# Patient Record
Sex: FEMALE | Race: WHITE | NOT HISPANIC OR LATINO | Employment: UNEMPLOYED | URBAN - METROPOLITAN AREA
[De-identification: names, ages, dates, MRNs, and addresses within clinical notes are randomized per-mention and may not be internally consistent; named-entity substitution may affect disease eponyms.]

---

## 2022-07-26 ENCOUNTER — TELEPHONE (OUTPATIENT)
Dept: PLASTIC SURGERY | Facility: CLINIC | Age: 37
End: 2022-07-26

## 2022-07-26 NOTE — TELEPHONE ENCOUNTER
What is your current bra size: [id number]  Has your bra cup size been stable for at least the last 6 months: [x]Yes []No    Have you had treatment for upper back, neck, and/or shoulder pain: []Yes [x]No     What treatment: (Physical therapy, chiropractor, massage therapy, medications,   Etc.MEDICATIONS   Do you have a rash and/or moisture: [x]Yes []No   Are you using a Rx cream: [x]Yes []No (If no prescribed cream, refer to PCP to have something prescribed.)    What Cream and who prescribed it:DOESN'T KNOW NAME   Do you have shoulder Grooving: [x]Yes []No    Do you wear multiple bras: []Yes [x]No    Any trouble with activities of daily living: [x]Yes []No      What activities: TAKING CARE OF [age] YEAR OLD   Any trouble exercising: [x]Yes []No     What exercises: RUNNING  Nicotine use    Do you smoke: []Yes [x]No     Do you Vape: []Yes [x]No     Contain Nicotine: []Yes [x]No   Do you use smokeless tobacco products: []Yes [x]No   If yes to any of the above, are you willing to quit: [x]Yes []No

## 2022-09-08 ENCOUNTER — OFFICE VISIT (OUTPATIENT)
Dept: PLASTIC SURGERY | Facility: CLINIC | Age: 37
End: 2022-09-08

## 2022-09-08 VITALS
TEMPERATURE: 98 F | OXYGEN SATURATION: 97 % | SYSTOLIC BLOOD PRESSURE: 118 MMHG | HEART RATE: 89 BPM | HEIGHT: 71 IN | DIASTOLIC BLOOD PRESSURE: 77 MMHG | BODY MASS INDEX: 27.05 KG/M2 | WEIGHT: 193.2 LBS

## 2022-09-08 DIAGNOSIS — N62 BREAST HYPERTROPHY: Primary | ICD-10-CM

## 2022-09-08 PROCEDURE — 99202 OFFICE O/P NEW SF 15 MIN: CPT | Performed by: SURGERY

## 2022-09-13 PROBLEM — N62 BREAST HYPERTROPHY: Status: ACTIVE | Noted: 2022-09-13
